# Patient Record
Sex: FEMALE | Race: WHITE
[De-identification: names, ages, dates, MRNs, and addresses within clinical notes are randomized per-mention and may not be internally consistent; named-entity substitution may affect disease eponyms.]

---

## 2022-12-05 ENCOUNTER — ASOB RESULT (OUTPATIENT)
Age: 32
End: 2022-12-05

## 2022-12-05 ENCOUNTER — TRANSCRIPTION ENCOUNTER (OUTPATIENT)
Age: 32
End: 2022-12-05

## 2022-12-05 ENCOUNTER — APPOINTMENT (OUTPATIENT)
Dept: ANTEPARTUM | Facility: CLINIC | Age: 32
End: 2022-12-05

## 2022-12-05 ENCOUNTER — INPATIENT (INPATIENT)
Facility: HOSPITAL | Age: 32
LOS: 0 days | Discharge: HOME | DRG: 833 | End: 2022-12-05
Attending: OBSTETRICS & GYNECOLOGY | Admitting: OBSTETRICS & GYNECOLOGY
Payer: COMMERCIAL

## 2022-12-05 ENCOUNTER — OUTPATIENT (OUTPATIENT)
Dept: INPATIENT UNIT | Facility: HOSPITAL | Age: 32
LOS: 1 days | Discharge: ANOTHER HOSPITAL | End: 2022-12-05
Payer: COMMERCIAL

## 2022-12-05 VITALS — HEART RATE: 84 BPM | OXYGEN SATURATION: 100 %

## 2022-12-05 VITALS — HEART RATE: 86 BPM | OXYGEN SATURATION: 98 %

## 2022-12-05 VITALS — HEIGHT: 62 IN | WEIGHT: 156.97 LBS

## 2022-12-05 DIAGNOSIS — O47.03 FALSE LABOR BEFORE 37 COMPLETED WEEKS OF GESTATION, THIRD TRIMESTER: ICD-10-CM

## 2022-12-05 DIAGNOSIS — O26.899 OTHER SPECIFIED PREGNANCY RELATED CONDITIONS, UNSPECIFIED TRIMESTER: ICD-10-CM

## 2022-12-05 PROBLEM — Z00.00 ENCOUNTER FOR PREVENTIVE HEALTH EXAMINATION: Status: ACTIVE | Noted: 2022-12-05

## 2022-12-05 LAB
ALBUMIN SERPL ELPH-MCNC: 2.9 G/DL — LOW (ref 3.3–5)
ALP SERPL-CCNC: 65 U/L — SIGNIFICANT CHANGE UP (ref 40–120)
ALT FLD-CCNC: 21 U/L — SIGNIFICANT CHANGE UP (ref 12–78)
AMNISURE ROM (RUPTURE OF MEMBRANES): NEGATIVE — SIGNIFICANT CHANGE UP
ANION GAP SERPL CALC-SCNC: 4 MMOL/L — LOW (ref 5–17)
APPEARANCE UR: CLEAR — SIGNIFICANT CHANGE UP
AST SERPL-CCNC: 14 U/L — LOW (ref 15–37)
BASOPHILS # BLD AUTO: 0.02 K/UL — SIGNIFICANT CHANGE UP (ref 0–0.2)
BASOPHILS NFR BLD AUTO: 0.2 % — SIGNIFICANT CHANGE UP (ref 0–2)
BILIRUB SERPL-MCNC: 0.2 MG/DL — SIGNIFICANT CHANGE UP (ref 0.2–1.2)
BILIRUB UR-MCNC: NEGATIVE — SIGNIFICANT CHANGE UP
BUN SERPL-MCNC: 6 MG/DL — LOW (ref 7–23)
CALCIUM SERPL-MCNC: 8.2 MG/DL — LOW (ref 8.5–10.1)
CHLORIDE SERPL-SCNC: 109 MMOL/L — HIGH (ref 96–108)
CO2 SERPL-SCNC: 24 MMOL/L — SIGNIFICANT CHANGE UP (ref 22–31)
COLOR SPEC: YELLOW — SIGNIFICANT CHANGE UP
COVID-19 SPIKE DOMAIN AB INTERP: POSITIVE
COVID-19 SPIKE DOMAIN ANTIBODY RESULT: >250 U/ML — HIGH
CREAT SERPL-MCNC: 0.44 MG/DL — LOW (ref 0.5–1.3)
DIFF PNL FLD: NEGATIVE — SIGNIFICANT CHANGE UP
EGFR: 133 ML/MIN/1.73M2 — SIGNIFICANT CHANGE UP
EOSINOPHIL # BLD AUTO: 0.04 K/UL — SIGNIFICANT CHANGE UP (ref 0–0.5)
EOSINOPHIL NFR BLD AUTO: 0.4 % — SIGNIFICANT CHANGE UP (ref 0–6)
FIBRONECTIN FETAL SPEC QL: POSITIVE
GLUCOSE SERPL-MCNC: 118 MG/DL — HIGH (ref 70–99)
GLUCOSE UR QL: NEGATIVE — SIGNIFICANT CHANGE UP
HBV SURFACE AB SER-ACNC: REACTIVE
HCT VFR BLD CALC: 33.3 % — LOW (ref 34.5–45)
HGB BLD-MCNC: 11.4 G/DL — LOW (ref 11.5–15.5)
IMM GRANULOCYTES NFR BLD AUTO: 1 % — HIGH (ref 0–0.9)
KETONES UR-MCNC: NEGATIVE — SIGNIFICANT CHANGE UP
LEUKOCYTE ESTERASE UR-ACNC: ABNORMAL
LYMPHOCYTES # BLD AUTO: 1.15 K/UL — SIGNIFICANT CHANGE UP (ref 1–3.3)
LYMPHOCYTES # BLD AUTO: 11.1 % — LOW (ref 13–44)
MCHC RBC-ENTMCNC: 31.8 PG — SIGNIFICANT CHANGE UP (ref 27–34)
MCHC RBC-ENTMCNC: 34.2 GM/DL — SIGNIFICANT CHANGE UP (ref 32–36)
MCV RBC AUTO: 92.8 FL — SIGNIFICANT CHANGE UP (ref 80–100)
MONOCYTES # BLD AUTO: 0.16 K/UL — SIGNIFICANT CHANGE UP (ref 0–0.9)
MONOCYTES NFR BLD AUTO: 1.5 % — LOW (ref 2–14)
NEUTROPHILS # BLD AUTO: 8.87 K/UL — HIGH (ref 1.8–7.4)
NEUTROPHILS NFR BLD AUTO: 85.8 % — HIGH (ref 43–77)
NITRITE UR-MCNC: NEGATIVE — SIGNIFICANT CHANGE UP
PH UR: 8 — SIGNIFICANT CHANGE UP (ref 5–8)
PLATELET # BLD AUTO: 275 K/UL — SIGNIFICANT CHANGE UP (ref 150–400)
POTASSIUM SERPL-MCNC: 3.9 MMOL/L — SIGNIFICANT CHANGE UP (ref 3.5–5.3)
POTASSIUM SERPL-SCNC: 3.9 MMOL/L — SIGNIFICANT CHANGE UP (ref 3.5–5.3)
PROT SERPL-MCNC: 6.6 GM/DL — SIGNIFICANT CHANGE UP (ref 6–8.3)
PROT UR-MCNC: NEGATIVE — SIGNIFICANT CHANGE UP
RBC # BLD: 3.59 M/UL — LOW (ref 3.8–5.2)
RBC # FLD: 12.7 % — SIGNIFICANT CHANGE UP (ref 10.3–14.5)
SARS-COV-2 IGG+IGM SERPL QL IA: >250 U/ML — HIGH
SARS-COV-2 IGG+IGM SERPL QL IA: POSITIVE
SARS-COV-2 RNA SPEC QL NAA+PROBE: SIGNIFICANT CHANGE UP
SODIUM SERPL-SCNC: 137 MMOL/L — SIGNIFICANT CHANGE UP (ref 135–145)
SP GR SPEC: 1.01 — SIGNIFICANT CHANGE UP (ref 1.01–1.02)
URATE SERPL-MCNC: 4.9 MG/DL — SIGNIFICANT CHANGE UP (ref 2.5–7)
UROBILINOGEN FLD QL: NEGATIVE — SIGNIFICANT CHANGE UP
WBC # BLD: 10.34 K/UL — SIGNIFICANT CHANGE UP (ref 3.8–10.5)
WBC # FLD AUTO: 10.34 K/UL — SIGNIFICANT CHANGE UP (ref 3.8–10.5)

## 2022-12-05 PROCEDURE — 86780 TREPONEMA PALLIDUM: CPT

## 2022-12-05 PROCEDURE — 36415 COLL VENOUS BLD VENIPUNCTURE: CPT

## 2022-12-05 PROCEDURE — 86900 BLOOD TYPING SEROLOGIC ABO: CPT

## 2022-12-05 PROCEDURE — 85025 COMPLETE CBC W/AUTO DIFF WBC: CPT

## 2022-12-05 PROCEDURE — 86901 BLOOD TYPING SEROLOGIC RH(D): CPT

## 2022-12-05 PROCEDURE — 86769 SARS-COV-2 COVID-19 ANTIBODY: CPT

## 2022-12-05 PROCEDURE — 76805 OB US >/= 14 WKS SNGL FETUS: CPT | Mod: 26

## 2022-12-05 PROCEDURE — 59025 FETAL NON-STRESS TEST: CPT

## 2022-12-05 PROCEDURE — 84112 EVAL AMNIOTIC FLUID PROTEIN: CPT

## 2022-12-05 PROCEDURE — 87591 N.GONORRHOEAE DNA AMP PROB: CPT

## 2022-12-05 PROCEDURE — 87653 STREP B DNA AMP PROBE: CPT

## 2022-12-05 PROCEDURE — 81001 URINALYSIS AUTO W/SCOPE: CPT

## 2022-12-05 PROCEDURE — 86762 RUBELLA ANTIBODY: CPT

## 2022-12-05 PROCEDURE — 86850 RBC ANTIBODY SCREEN: CPT

## 2022-12-05 PROCEDURE — 86706 HEP B SURFACE ANTIBODY: CPT

## 2022-12-05 PROCEDURE — G0378: CPT

## 2022-12-05 PROCEDURE — 82731 ASSAY OF FETAL FIBRONECTIN: CPT

## 2022-12-05 PROCEDURE — 87086 URINE CULTURE/COLONY COUNT: CPT

## 2022-12-05 PROCEDURE — U0003: CPT

## 2022-12-05 PROCEDURE — 76817 TRANSVAGINAL US OBSTETRIC: CPT | Mod: 26

## 2022-12-05 PROCEDURE — 99214 OFFICE O/P EST MOD 30 MIN: CPT

## 2022-12-05 PROCEDURE — 87491 CHLMYD TRACH DNA AMP PROBE: CPT

## 2022-12-05 PROCEDURE — 76819 FETAL BIOPHYS PROFIL W/O NST: CPT | Mod: 26

## 2022-12-05 PROCEDURE — 76817 TRANSVAGINAL US OBSTETRIC: CPT

## 2022-12-05 PROCEDURE — U0005: CPT

## 2022-12-05 PROCEDURE — 80053 COMPREHEN METABOLIC PANEL: CPT

## 2022-12-05 PROCEDURE — 84550 ASSAY OF BLOOD/URIC ACID: CPT

## 2022-12-05 RX ORDER — INDOMETHACIN 50 MG
25 CAPSULE ORAL EVERY 6 HOURS
Refills: 0 | Status: DISCONTINUED | OUTPATIENT
Start: 2022-12-05 | End: 2022-12-05

## 2022-12-05 RX ORDER — AMPICILLIN TRIHYDRATE 250 MG
1 CAPSULE ORAL EVERY 4 HOURS
Refills: 0 | Status: DISCONTINUED | OUTPATIENT
Start: 2022-12-05 | End: 2022-12-05

## 2022-12-05 RX ORDER — MAGNESIUM SULFATE 500 MG/ML
2 VIAL (ML) INJECTION
Qty: 40 | Refills: 0 | Status: DISCONTINUED | OUTPATIENT
Start: 2022-12-05 | End: 2022-12-05

## 2022-12-05 RX ORDER — INFLUENZA VIRUS VACCINE 15; 15; 15; 15 UG/.5ML; UG/.5ML; UG/.5ML; UG/.5ML
0.5 SUSPENSION INTRAMUSCULAR ONCE
Refills: 0 | Status: DISCONTINUED | OUTPATIENT
Start: 2022-12-05 | End: 2022-12-05

## 2022-12-05 RX ORDER — SODIUM CHLORIDE 9 MG/ML
1000 INJECTION, SOLUTION INTRAVENOUS
Refills: 0 | Status: DISCONTINUED | OUTPATIENT
Start: 2022-12-05 | End: 2022-12-05

## 2022-12-05 RX ORDER — ONDANSETRON 8 MG/1
4 TABLET, FILM COATED ORAL ONCE
Refills: 0 | Status: COMPLETED | OUTPATIENT
Start: 2022-12-05 | End: 2022-12-05

## 2022-12-05 RX ORDER — SODIUM CHLORIDE 9 MG/ML
1000 INJECTION, SOLUTION INTRAVENOUS ONCE
Refills: 0 | Status: COMPLETED | OUTPATIENT
Start: 2022-12-05 | End: 2022-12-05

## 2022-12-05 RX ORDER — INDOMETHACIN 50 MG
50 CAPSULE ORAL ONCE
Refills: 0 | Status: COMPLETED | OUTPATIENT
Start: 2022-12-05 | End: 2022-12-05

## 2022-12-05 RX ORDER — MAGNESIUM SULFATE 500 MG/ML
4 VIAL (ML) INJECTION ONCE
Refills: 0 | Status: DISCONTINUED | OUTPATIENT
Start: 2022-12-05 | End: 2022-12-05

## 2022-12-05 RX ORDER — SODIUM CHLORIDE 9 MG/ML
1000 INJECTION, SOLUTION INTRAVENOUS ONCE
Refills: 0 | Status: DISCONTINUED | OUTPATIENT
Start: 2022-12-05 | End: 2022-12-05

## 2022-12-05 RX ORDER — AMPICILLIN TRIHYDRATE 250 MG
2 CAPSULE ORAL ONCE
Refills: 0 | Status: COMPLETED | OUTPATIENT
Start: 2022-12-05 | End: 2022-12-05

## 2022-12-05 RX ORDER — INDOMETHACIN 50 MG
25 CAPSULE ORAL ONCE
Refills: 0 | Status: COMPLETED | OUTPATIENT
Start: 2022-12-05 | End: 2022-12-05

## 2022-12-05 RX ORDER — MAGNESIUM SULFATE 500 MG/ML
4 VIAL (ML) INJECTION ONCE
Refills: 0 | Status: COMPLETED | OUTPATIENT
Start: 2022-12-05 | End: 2022-12-05

## 2022-12-05 RX ADMIN — Medication 12 MILLIGRAM(S): at 07:15

## 2022-12-05 RX ADMIN — Medication 50 GM/HR: at 09:58

## 2022-12-05 RX ADMIN — Medication 216 GRAM(S): at 09:15

## 2022-12-05 RX ADMIN — SODIUM CHLORIDE 75 MILLILITER(S): 9 INJECTION, SOLUTION INTRAVENOUS at 09:34

## 2022-12-05 RX ADMIN — Medication 25 MILLIGRAM(S): at 13:53

## 2022-12-05 RX ADMIN — SODIUM CHLORIDE 1000 MILLILITER(S): 9 INJECTION, SOLUTION INTRAVENOUS at 05:16

## 2022-12-05 RX ADMIN — Medication 300 GRAM(S): at 09:15

## 2022-12-05 RX ADMIN — SODIUM CHLORIDE 125 MILLILITER(S): 9 INJECTION, SOLUTION INTRAVENOUS at 12:30

## 2022-12-05 RX ADMIN — Medication 50 MILLIGRAM(S): at 06:39

## 2022-12-05 RX ADMIN — SODIUM CHLORIDE 2000 MILLILITER(S): 9 INJECTION, SOLUTION INTRAVENOUS at 12:00

## 2022-12-05 RX ADMIN — Medication 25 MILLIGRAM(S): at 14:08

## 2022-12-05 RX ADMIN — ONDANSETRON 4 MILLIGRAM(S): 8 TABLET, FILM COATED ORAL at 10:00

## 2022-12-05 NOTE — CONSULT NOTE ADULT - ASSESSMENT
Assessment:     Recommendations:   -Observe on labor and delivery with serial cervical lengths  -Follow up GBS-sent from ELIZABETH Schneider Fellow

## 2022-12-05 NOTE — OB PROVIDER H&P - HISTORY OF PRESENT ILLNESS
31 yo  @ 28w 1d (MAGNUS:23) presenting as a transfer from [facility] for concern for  labor. Patient was awoken from sleep at 130am with contraction like pain. She also noticed at that time, increased discharge. Octavia called her doctor and was told to go to the hospital for evaluation. On her way to the hospital patient started to time the contractions and they were every 2-4 minutes with a pain of 6/10. Patient also had recent unprotected intercourse last nig. At [facility] patient had a complete work up, which results of FFN being positive. (Please see below for further examination details). Patient was given Indocin, Bmz, Amp, Mag and decision was to transfer patient to higher level of care. –VB, -LOF, +FM. denies fever, chills, nausea, vomiting, diarrhea, headache, constipation, dizziness, syncope, chest pain, palpitations, shortness of breath, dysuria, urgency, frequency.  PNC: [facility] - [place]  GBS pending   EFW: 1221g  ObHx: denies  GynHx: denies fibroids, cysts, STD, STI, abn pap  MedHx: denies   SrgHx: denies   PsychHx: denies anxiety and depression  SocialHx: denies smoking, drinking and drugs   AllergyHx: denies  RxHx: PNV

## 2022-12-05 NOTE — OB PROVIDER TRIAGE NOTE - HISTORY OF PRESENT ILLNESS
33 yo  @ 28w 1d (MAGNUS:23) presenting as a transfer from [facility] for concern for  labor. Patient was awoken from sleep at 130am with contraction like pain. She also noticed at that time, increased discharge. Octavia called her doctor and was told to go to the hospital for evaluation. On her way to the hospital patient started to time the contractions and they were every 2-4 minutes with a pain of 6/10. Patient also had recent unprotected intercourse last nig. At [facility] patient had a complete work up, which results of FFN being positive. (Please see below for further examination details). Patient was given Indocin, Bmz, Amp, Mag and decision was to transfer patient to higher level of care. –VB, -LOF, +FM. denies fever, chills, nausea, vomiting, diarrhea, headache, constipation, dizziness, syncope, chest pain, palpitations, shortness of breath, dysuria, urgency, frequency.  PNC: [facility] - [place]  GBS pending   EFW: 1221g  ObHx: denies  GynHx: denies fibroids, cysts, STD, STI, abn pap  MedHx: denies   SrgHx: denies   PsychHx: denies anxiety and depression  SocialHx: denies smoking, drinking and drugs   AllergyHx: denies  RxHx: PNV

## 2022-12-05 NOTE — OB PROVIDER H&P - NSHPPHYSICALEXAM_GEN_ALL_CORE
NAD  CV:RRR  Resp:CTA b/l   Abd: Nontender Gravid   sve:0/0/-3   TVUS: 12/5: FH:151, posterior placenta, CL:3.3 BPP:8/8   FHT: Baseline 150, moderate variability, +accesl, -decels  [place]: q 2-3 minutes at [place]   Sono: Vtx, laura:21.8 8/8, 1221g(55%), CL:3.8

## 2022-12-05 NOTE — OB PROVIDER TRIAGE NOTE - NSOBPROVIDERNOTE_OBGYN_ALL_OB_FT
32 year old  @ 28 weeks 1 day presenting as a transfer from [place]     -Indocin 50mg STAT followed by Indocin 25mg   -Bmz#1 @ 715 am on    -Mag started at 915am  []GBS pending   -GC/CT pending   -Vaginitis panel     Addendum     -NST to be performed with [facility]   -Low concern for PTL at this time, patient CL is long, contractions are spacing , patient more comfortable   -VE:0/0/-3   -Discontinue Magnesium, Ampicillin at this time     Addendum   - Patient to be discharged on Indocin  -Return tomorrow for BMZ#2  -GBS pending    -Recommend BPP / RASHEEDA check in 2-3 days as Indocin can cause oligohydramnios      d/w Dr. Jose G Norwood, PGY3 32 year old  @ 28 weeks 1 day presenting as a transfer from [place]     -Indocin 50mg STAT followed by Indocin 25mg   -Bmz#1 @ 715 am on    -Mag started at 915am  []GBS pending   -GC/CT pending   -Vaginitis panel     Addendum     -NST to be performed with [place]   -Low concern for PTL at this time, patient CL is long, contractions are spacing , patient more comfortable   -VE:0/0/-3   -Discontinue Magnesium, Ampicillin at this time     Addendum   - Patient to be discharged  to follow up with [place] for BMZ#2  -GBS pending     -Return precuations given       d/w Dr. Jose G Norwood, PGY3

## 2022-12-05 NOTE — DISCHARGE NOTE ANTEPARTUM - CARE PLAN
Principal Discharge DX:	 uterine contractions, antepartum, third trimester  Assessment and plan of treatment:	Patient was evaluated for  CTX @ 28w1d  1) Membranes intact: amnisure negative, FFP positive, CL 3.3cm, SVE 0.5/50/-3  2) s/p betamethasone @ 0715 for fetal lung maturity  3) 50gm indocin given at 0649 for tocolysis  4) Mag started at 0920 for fetal neuroprotection  5) Ampicillin started for GBS ppx. Cultures obtained.   1

## 2022-12-05 NOTE — DISCHARGE NOTE ANTEPARTUM - PROVIDER TOKENS
FREE:[LAST:[[facility]],PHONE:[(   )    -],FAX:[(   )    -],ADDRESS:[[facility]  [place]]]

## 2022-12-05 NOTE — DISCHARGE NOTE ANTEPARTUM - MEDICATION SUMMARY - MEDICATIONS TO TAKE
I will START or STAY ON the medications listed below when I get home from the hospital:    Prenatal Multivitamins with Folic Acid 1 mg oral tablet  -- 1 tab(s) by mouth once a day  -- Indication: For home med

## 2022-12-05 NOTE — CONSULT NOTE ADULT - PROBLEM SELECTOR RECOMMENDATION 9
- maternal VSS, afebrile  - ctx q2m on toco s/p LR bolus, FFN pos, CL 3.3cm, cervix appeared closed on spec  - no gross pooling on spec, amnisure neg, bedside RASHEEDA 15  - vaginitis/GCCT pending  - UA only w trace leuk esterase, neg nitrites, UCx pending  - administer indocin 50mg stat followed by 25mg q6h x8 doses  - administer bmz q24h x2 doses  - repeat vaginal exam if pain/pressure increases and/or ctx do not subside 2h after indocin administration  - administer amp, Mg if progresses into PTL

## 2022-12-05 NOTE — DISCHARGE NOTE ANTEPARTUM - CARE PROVIDER_API CALL
Norma Cuevas)  Obstetrics and Gynecology  [address] [place] [address]  Phone: [phone]  Fax: [phone]  Follow Up Time:

## 2022-12-05 NOTE — OB PROVIDER H&P - ASSESSMENT
32 year old  @ 28 weeks 1 day presenting as a transfer from [place]     -Indocin 50mg STAT followed by Indocin 25mg   -Bmz#1 @ 715 am on    -Mag started at 915am  []GBS pending   -GC/CT pending   -Vaginitis panel     Addendum     -NST to be performed with [place]   -Low concern for PTL at this time, patient CL is long, contractions are spacing , patient more comfortable   -VE:0/0/-3   -Discontinue Magnesium, Ampicillin at this time     Addendum   - Patient to be discharged  to follow up with [place] for BMZ#2  -GBS pending     -Return precautions given       d/w Dr. Jose G Norwood, PGY3

## 2022-12-05 NOTE — DISCHARGE NOTE ANTEPARTUM - CARE PLAN
Principal Discharge DX:	 contractions  Assessment and plan of treatment:	phyllis presented with  contractions that resolved with Indocin. S/p BMZ # 1, needs to get BMZ#2 tomorrow   1

## 2022-12-05 NOTE — CONSULT NOTE ADULT - SUBJECTIVE AND OBJECTIVE BOX
Maternal Fetal Medicine Consult Note    HPI: The patient is a 31 yo  at 28w1d based on reported MAGNUS 23       (MAGNUS 23) presenting as a transfer from [facility] for concern for  labor. Patient was awoken from sleep at 130am with contraction like pain. She also noticed at that time, increased discharge. Octavia called her doctor and was told to go to the hospital for evaluation. On her way to the hospital patient started to time the contractions and they were every 2-4 minutes with a pain of 6/10. Patient also had recent unprotected intercourse last nig. At [facility] patient had a complete work up, which results of FFN being positive. (Please see below for further examination details). Patient was given Indocin, Bmz, Amp, Mag and decision was to transfer patient to higher level of care. –VB, -LOF, +FM. denies fever, chills, nausea, vomiting, diarrhea, headache, constipation, dizziness, syncope, chest pain, palpitations, shortness of breath, dysuria, urgency, frequency.    PNC: [facility]  [place]  GBS pending   EFW: 1221g    History:   OB: G1  Gyn: denies fibroids, cysts, STD, STI, abn pap  PMH: denies   PSH: denies   Meds: PNV   Alls: NKDA  SH: denies smoking, drinking and drugs; denies anxiety and depression    Objective:     T(C): 37.0 (22 @ 17:56), Max: 37.0 (22 @ 17:56)  HR: 86 (22 @ 18:04) (71 - 102)  BP: 109/62 (22 @ 17:56) (106/62 - 123/81)  RR: 18 (22 @ 17:56) (18 - 18)  SpO2: 98% (22 @ 18:04) (96% - 100%)  Exam:         Assessment:     Plan:     Lucero Webber MD   [facility] Fellow   Maternal Fetal Medicine Consult Note    HPI: The patient is a 33 yo  at 28w1d based on reported MAGNUS 23 consistent with bedside biometry transferred from [facility] for  labor after reporting painful contractions overnight, accompanied by increased vaginal discharge. Preceding contraction onset patient had vaginal intercourse with partner ejaculation. At [place] she was found to be shelli every 2 minutes with some cervical effacement noted (FT/L/H --> FT/50/-3) and was given a dose of betamethasone for fetal lung maturity (7:15am) as well as ampicillin, indomethacin for tocolysis, and magnesium for fetal neuroprotection. Cervical length performed at [place] was 3.3cm, FFN positive. GBS collected and pending.     On arrival to [place], patient reports improvement in contraction-related pain. She now has mild pelvic cramping. She denies vaginal bleeding, leakage of fluid, fevers, chills, recent illness, urinary complaints. Prenatal care w/ Dr. Anguiano at [facility]; patient reports prenatal care uncomplicated.     History:   OB: G1  Gyn: denies fibroids, cysts, STD, STI, abn pap  PMH: denies   PSH: denies   Meds: PNV   Alls: NKDA  SH: denies smoking, drinking and drugs; denies anxiety and depression    Objective:     T(C): 37.0 (22 @ 17:56), Max: 37.0 (22 @ 17:56)  HR: 86 (22 @ 18:04) (71 - 102)  BP: 109/62 (22 @ 17:56) (106/62 - 123/81)  RR: 18 (22 @ 17:56) (18 - 18)  SpO2: 98% (22 @ 18:04) (96% - 100%)    Exam:   Gen: NAD, appears comfortable   Abd: Soft, gravid no fundal tenderness   Cervical exam (per resident): closed/long/posterior     NST ***                Maternal Fetal Medicine Consult Note    HPI: The patient is a 33 yo  at 28w1d based on reported MAGNUS 23 consistent with bedside biometry transferred from [facility] for  labor after reporting painful contractions overnight, accompanied by increased vaginal discharge. Preceding contraction onset patient had vaginal intercourse with partner ejaculation. At [place] she was found to be shelli every 2 minutes with some cervical effacement noted (FT/L/H --> FT/50/-3) and was given a dose of betamethasone for fetal lung maturity (7:15am) as well as ampicillin, indomethacin for tocolysis, and magnesium for fetal neuroprotection. Cervical length performed at [place] was 3.3cm, FFN positive. GBS collected and pending.     On arrival to [place], patient reports improvement in contraction-related pain. She now has mild pelvic cramping. She denies vaginal bleeding, leakage of fluid, fevers, chills, recent illness, urinary complaints. Prenatal care w/ Dr. Anguiano at [facility]; patient reports prenatal care uncomplicated.     History:   OB: G1  Gyn: denies fibroids, cysts, STD, STI, abn pap  PMH: denies   PSH: denies   Meds: PNV   Alls: NKDA  SH: denies smoking, drinking and drugs; denies anxiety and depression    Objective:     T(C): 37.0 (22 @ 17:56), Max: 37.0 (22 @ 17:56)  HR: 86 (22 @ 18:04) (71 - 102)  BP: 109/62 (22 @ 17:56) (106/62 - 123/81)  RR: 18 (22 @ 17:56) (18 - 18)  SpO2: 98% (22 @ 18:04) (96% - 100%)    Exam:   Gen: NAD, appears comfortable   Abd: Soft, gravid no fundal tenderness   Cervical exam (per resident): closed/long/posterior      moderate variability, +acceleration (10x10), -decelerations, loss of contact   Contractions q2-6 mins --> irritability on toco  Reactive NST     Sono: Cephalic, placenta posterior, RASHEEDA 22cm, EFW 1221g

## 2022-12-05 NOTE — DISCHARGE NOTE ANTEPARTUM - PLAN OF CARE
Patient was evaluated for  CTX @ 28w1d  1) Membranes intact: amnisure negative, FFP positive, CL 3.3cm, SVE 0.5/50/-3  2) s/p betamethasone @ 0715 for fetal lung maturity  3) 50gm indocin given at 0649 for tocolysis  4) Mag started at 0920 for fetal neuroprotection  5) Ampicillin started for GBS ppx. Cultures obtained.

## 2022-12-05 NOTE — DISCHARGE NOTE ANTEPARTUM - PATIENT PORTAL LINK FT
You can access the FollowMyHealth Patient Portal offered by [facility] by registering at the following website: [email or web address]. By joining [employer]’s FollowMyHealth portal, you will also be able to view your health information using other applications (apps) compatible with our system.

## 2022-12-05 NOTE — DISCHARGE NOTE ANTEPARTUM - HOSPITAL COURSE
33 yo  @ 28w 1d (MAGNUS:23) presenting as a transfer from [facility] for concern for  labor. Patient was awoken from sleep at 130am with contraction like pain. She also noticed at that time, increased discharge. Octavia called her doctor and was told to go to the hospital for evaluation. On her way to the hospital patient started to time the contractions and they were every 2-4 minutes with a pain of 6/10. Patient also had recent unprotected intercourse last nig. At [facility] patient had a complete work up, which results of FFN being positive. (Please see below for further examination details). Patient was given Indocin, Bmz, Amp, Mag and decision was to transfer patient to higher level of care. –VB, -LOF, +FM. denies fever, chills, nausea, vomiting, diarrhea, headache, constipation, dizziness, syncope, chest pain, palpitations, shortness of breath, dysuria, urgency, frequency.

## 2022-12-05 NOTE — DISCHARGE NOTE ANTEPARTUM - PATIENT PORTAL LINK FT
You can access the FollowMyHealth Patient Portal offered by [facility] by registering at the following website: [email or web address]. By joining [employer]’s FollowMyHealth portal, you will also be able to view your health information using other applications (apps) compatible with our system.

## 2022-12-05 NOTE — CONSULT NOTE ADULT - PROBLEM SELECTOR RECOMMENDATION 2
- NST reactive for GA, continuous monitoring  - BPP 8/8, RASHEEDA 15  - c/w daily pnv  - GBS pending, administer amp if progresses into PTL

## 2022-12-05 NOTE — CONSULT NOTE ADULT - ASSESSMENT
Assessment:     Plan:   -Spoke to OB resident Cheyenne at [facility] regarding patient presentation for second dose betamethasone on 12/6     ELIZABETH Rooney Fellow Assessment: 31 yo  at 28w1d presenting with  contractions found to have made cervical effacement, with a cervical length of 3.3cm and found to have closed internal os on repeat exam. Received betamethasone x1 prior to transfer. Following initiation of indomethacin and IV hydration patient's contractions spaced. After hours of observation patient remained comfortable.     Plan:   -Patient not in  labor found to be closed on repeat exam following transfer; in setting of cervical length >3cm, predicted likelihood of delivery within the next week is low (even despite positive FFN).   -Patient offered admission for observation versus discharge with outpatient second dose of betamethasone; after a few hours of feeling comfortable patient opted for discharge to home.  labor precautions discussed extensively.   -Would not necessarily recommend indomethacin if patient not in true  labor as purpose is to provide time to second betamethasone injection (which we anticipate she will make it to regardless). This plan was reviewed with the patient.   -Follow up GBS result   -Discussed with patient pelvic rest over next few days; otherwise no necessary activity limitations   -Patient has follow up sono at [facility] next week; encouraged to attend   -Spoke to OB resident Cheyenne at [facility] regarding patient presentation for second dose betamethasone on      ELIZABETH Rooney Fellow  Seen and d/w Dr. Benson

## 2022-12-05 NOTE — DISCHARGE NOTE ANTEPARTUM - ADDITIONAL INSTRUCTIONS
patient presented with  contractions that resolved with Indocin. S/p BMZ # 1, needs to get BMZ#2 tomorrow

## 2022-12-05 NOTE — OB PROVIDER TRIAGE NOTE - NSHPPHYSICALEXAM_GEN_ALL_CORE
NAD  CV:RRR  Resp:CTA b/l   Abd: Nontender Gravid   sve:0/0/-3   TVUS: 12/5: FH:151, posterior placenta, CL:3.3 BPP:8/8   FHT: Baseline 150, moderate variability, +accesl, -decels  [place]: q 2-3 minutes at [place]   Sono: Vtx, laura:21.8 8/8, 1221g(55%), CL:3.8

## 2022-12-05 NOTE — CONSULT NOTE ADULT - NSCONSULTADDITIONALINFOA_GEN_ALL_CORE
MFM - IUP at 28 weeks admitted with  contractions and threatened  labor.  FFN positive with regular contractions on tocometer.  FHRT Category I.  Cultures pending.  Recommend betamethasone and tocolysis with indocin.  Proceed with ampicillin, MGSO4 for fetal neuroprotection and consider transfer to higher level of NICU care if continued contractions, cervical change, or any change in maternal and/or fetal status.   Cleo Mann MD  Maternal Fetal Medicine

## 2022-12-05 NOTE — DISCHARGE NOTE ANTEPARTUM - NS MD DC FALL RISK RISK
For information on Fall & Injury Prevention, visit: [email or web address] OR  [email or web address] OR  https://www.cdc.gov/steadi/patient.html

## 2022-12-05 NOTE — PATIENT PROFILE OB - PURPOSEFUL PROACTIVE ROUNDING
thin frail, a x o x 3 speaking full sentences thin frail, speaking full sentences 2 L NC no use of accessory neck muscles thin frail, wearing blue knitted cap a x o x 3 on 4 L NC as above Patient

## 2022-12-05 NOTE — OB RN PATIENT PROFILE - FALL HARM RISK - UNIVERSAL INTERVENTIONS
Bed in lowest position, wheels locked, appropriate side rails in place/Call bell, personal items and telephone in reach/Instruct patient to call for assistance before getting out of bed or chair/Non-slip footwear when patient is out of bed/[place] to call system/Physically safe environment - no spills, clutter or unnecessary equipment/Purposeful Proactive Rounding/Room/bathroom lighting operational, light cord in reach

## 2022-12-05 NOTE — OB RN PATIENT PROFILE - FUNCTIONAL ASSESSMENT - BASIC MOBILITY SCORE HIDDEN
Quality 431: Preventive Care And Screening: Unhealthy Alcohol Use - Screening: Patient screened for unhealthy alcohol use using a single question and scores less than 2 times per year
Quality 130: Documentation Of Current Medications In The Medical Record: Current Medications Documented
Quality 226: Preventive Care And Screening: Tobacco Use: Screening And Cessation Intervention: Patient screened for tobacco use and is an ex/non-smoker
Detail Level: Detailed
24

## 2022-12-05 NOTE — DISCHARGE NOTE ANTEPARTUM - NS MD DC FALL RISK RISK
For information on Fall & Injury Prevention, visit: [email or web address] OR  [email or web address] OR  https://www.cdc.gov/steadi/patient.html

## 2022-12-05 NOTE — CONSULT NOTE ADULT - SUBJECTIVE AND OBJECTIVE BOX
Maternal Fetal Medicine Consult Note    HPI: 33 yo  at *** dated by *** transferred from [facility] for contractions beginning overnight.   PNC at [facility].     OB: G1  Gyn: ***  PMH: Denies  PSH: Denies  Meds: ***  Alls:   SH:     Objective:     T(C): 36.7 (22 @ 11:37), Max: 36.7 (22 @ 11:25)  HR: 79 (22 @ 12:03) (71 - 84)  BP: 121/74 (22 @ 11:56) (117/71 - 121/74)  RR: 18 (22 @ 11:37) (18 - 18)  SpO2: 99% (22 @ 12:03) (99% - 100%)    Exam:   Gen: NAD   Abd: soft, gravid with palpable contractions, nontender     Labs:   FFN positive ([place])   Amnisure negative     Sono: ***     NST: 145 bpm, moderate variability, -accelerations, -decelerations   Contractions q2-7 minutes   Cat I tracing

## 2022-12-05 NOTE — CONSULT NOTE ADULT - SUBJECTIVE AND OBJECTIVE BOX
32y  @28w1d GA per pt presents c/o intermittent abdominal pressure q2m for the past few hours with associated rectal pressure. Pt states she woke up from feeling intermittent midline upper abdominal pressure that radiated to the sides at times. She denies any associated pain. She notes that she noticed her underwear was damp when she woke up, but she hasn't had continuous leakage. She endorses normal FM, denies vaginal bleeding. Denies vaginal irritation/burning. Denies dysuria, hematuria. States her pregnancy has been uncomplicated so far. She follows w [facility].    PObGyn: denies h/o prior pregnancy, abnl pap, STDs, cysts/fibroids  PMH/PSH: denies  Meds: pnv  All: nkda  SH: never smoker, denies alcohol/drug use in pregnancy    VS as per flowsheet, afebrile, normotensive  Gen: well-appearing, NAD  Neuro: A&Ox3  Resp: breathing comfortably on RA  Abd: nontender, gravid  SSE: cervix appeared .5cm dilated, normal vaginal discharge, no gross pooling, amnisure neg, GCCT/vaginitis swabs pending, FFN pos  FHT: baseline 150, mod variability, +accels, -decels  [place]: ctx q2-3m    Bedside US: BPP 8/8, RASHEEDA 15, posterior placenta    AmniSure ROM (Rupture of Membranes): Negative: informed LISA Salazar  2022 04:46:42 EST (22 @ 04:22)    Fibronectin, Fetal - Result: Positive (22 @ 04:22)    CBC, CMP pending    < from: US Transvaginal, OB (22 @ 04:57) >  FINDINGS:    A single, viable intrauterine gestation is identified in   cephalicpresentation. The fetal heart rate measures 151beats per minute.    The placenta is posterior without previa. Cervix is long and closed   measuring 3.3 cm on transvaginal study. Engorgement of vessels identified   within the posterior aspect of the cervix. Amniotic fluid appears   subjectively within normal limits.    IMPRESSION:    Single live intrauterine gestation in cephalic presentation.  Cervix is long and closed measuring 3.3 cm on transvaginal study.    < end of copied text >

## 2022-12-05 NOTE — DISCHARGE NOTE ANTEPARTUM - HOSPITAL COURSE
Patient was evaluated for  CTX @ 28w1d. Membranes intact: amnisure negative, FFP positive, CL 3.3cm, SVE 0.5/50/-3. Betamethasone @ 0715 for fetal lung maturity, 50gm indocin given at 0649 for tocolysis. Mag started at 0920 for fetal neuroprotection. Ampicillin started for GBS ppx. Cultures obtained. COVID swab pending. Transferred to [facility] for antepartum management.

## 2022-12-06 ENCOUNTER — OUTPATIENT (OUTPATIENT)
Dept: INPATIENT UNIT | Facility: HOSPITAL | Age: 32
LOS: 1 days | Discharge: HOME | End: 2022-12-06
Payer: COMMERCIAL

## 2022-12-06 DIAGNOSIS — O26.899 OTHER SPECIFIED PREGNANCY RELATED CONDITIONS, UNSPECIFIED TRIMESTER: ICD-10-CM

## 2022-12-06 PROBLEM — Z78.9 OTHER SPECIFIED HEALTH STATUS: Chronic | Status: ACTIVE | Noted: 2022-12-05

## 2022-12-06 LAB
CULTURE RESULTS: SIGNIFICANT CHANGE UP
GROUP B BETA STREP DNA (PCR): SIGNIFICANT CHANGE UP
RUBV IGG SER-ACNC: 8.3 INDEX — SIGNIFICANT CHANGE UP
RUBV IGG SER-IMP: POSITIVE — SIGNIFICANT CHANGE UP
SOURCE GROUP B STREP: SIGNIFICANT CHANGE UP
SPECIMEN SOURCE: SIGNIFICANT CHANGE UP
T PALLIDUM AB TITR SER: NEGATIVE — SIGNIFICANT CHANGE UP

## 2022-12-06 PROCEDURE — 59025 FETAL NON-STRESS TEST: CPT

## 2022-12-06 PROCEDURE — 96372 THER/PROPH/DIAG INJ SC/IM: CPT

## 2022-12-06 PROCEDURE — 99214 OFFICE O/P EST MOD 30 MIN: CPT

## 2022-12-06 RX ADMIN — Medication 12 MILLIGRAM(S): at 08:03

## 2022-12-07 DIAGNOSIS — Z3A.00 WEEKS OF GESTATION OF PREGNANCY NOT SPECIFIED: ICD-10-CM

## 2022-12-07 DIAGNOSIS — O26.899 OTHER SPECIFIED PREGNANCY RELATED CONDITIONS, UNSPECIFIED TRIMESTER: ICD-10-CM

## 2022-12-08 DIAGNOSIS — O26.899 OTHER SPECIFIED PREGNANCY RELATED CONDITIONS, UNSPECIFIED TRIMESTER: ICD-10-CM

## 2022-12-08 DIAGNOSIS — Z3A.00 WEEKS OF GESTATION OF PREGNANCY NOT SPECIFIED: ICD-10-CM

## 2023-02-06 ENCOUNTER — ASOB RESULT (OUTPATIENT)
Age: 33
End: 2023-02-06

## 2023-02-06 ENCOUNTER — APPOINTMENT (OUTPATIENT)
Dept: ANTEPARTUM | Facility: CLINIC | Age: 33
End: 2023-02-06
Payer: COMMERCIAL

## 2023-02-06 PROCEDURE — 76816 OB US FOLLOW-UP PER FETUS: CPT

## 2023-02-27 ENCOUNTER — TRANSCRIPTION ENCOUNTER (OUTPATIENT)
Age: 33
End: 2023-02-27

## 2023-02-27 ENCOUNTER — INPATIENT (INPATIENT)
Facility: HOSPITAL | Age: 33
LOS: 1 days | Discharge: HOME | End: 2023-03-01
Attending: OBSTETRICS & GYNECOLOGY | Admitting: OBSTETRICS & GYNECOLOGY
Payer: COMMERCIAL

## 2023-02-27 VITALS — WEIGHT: 169.98 LBS | HEIGHT: 62 IN

## 2023-02-27 DIAGNOSIS — O26.899 OTHER SPECIFIED PREGNANCY RELATED CONDITIONS, UNSPECIFIED TRIMESTER: ICD-10-CM

## 2023-02-27 LAB
BASOPHILS # BLD AUTO: 0.04 K/UL — SIGNIFICANT CHANGE UP (ref 0–0.2)
BASOPHILS NFR BLD AUTO: 0.4 % — SIGNIFICANT CHANGE UP (ref 0–2)
COVID-19 SPIKE DOMAIN AB INTERP: POSITIVE
COVID-19 SPIKE DOMAIN ANTIBODY RESULT: >250 U/ML — HIGH
EOSINOPHIL # BLD AUTO: 0.15 K/UL — SIGNIFICANT CHANGE UP (ref 0–0.5)
EOSINOPHIL NFR BLD AUTO: 1.5 % — SIGNIFICANT CHANGE UP (ref 0–6)
HBV SURFACE AG SERPL QL IA: SIGNIFICANT CHANGE UP
HCT VFR BLD CALC: 35.3 % — SIGNIFICANT CHANGE UP (ref 34.5–45)
HGB BLD-MCNC: 11.9 G/DL — SIGNIFICANT CHANGE UP (ref 11.5–15.5)
IMM GRANULOCYTES NFR BLD AUTO: 0.9 % — SIGNIFICANT CHANGE UP (ref 0–0.9)
LYMPHOCYTES # BLD AUTO: 1.92 K/UL — SIGNIFICANT CHANGE UP (ref 1–3.3)
LYMPHOCYTES # BLD AUTO: 19.5 % — SIGNIFICANT CHANGE UP (ref 13–44)
MCHC RBC-ENTMCNC: 30.6 PG — SIGNIFICANT CHANGE UP (ref 27–34)
MCHC RBC-ENTMCNC: 33.7 GM/DL — SIGNIFICANT CHANGE UP (ref 32–36)
MCV RBC AUTO: 90.7 FL — SIGNIFICANT CHANGE UP (ref 80–100)
MONOCYTES # BLD AUTO: 0.84 K/UL — SIGNIFICANT CHANGE UP (ref 0–0.9)
MONOCYTES NFR BLD AUTO: 8.5 % — SIGNIFICANT CHANGE UP (ref 2–14)
NEUTROPHILS # BLD AUTO: 6.8 K/UL — SIGNIFICANT CHANGE UP (ref 1.8–7.4)
NEUTROPHILS NFR BLD AUTO: 69.2 % — SIGNIFICANT CHANGE UP (ref 43–77)
PLATELET # BLD AUTO: 239 K/UL — SIGNIFICANT CHANGE UP (ref 150–400)
RBC # BLD: 3.89 M/UL — SIGNIFICANT CHANGE UP (ref 3.8–5.2)
RBC # FLD: 13.3 % — SIGNIFICANT CHANGE UP (ref 10.3–14.5)
SARS-COV-2 IGG+IGM SERPL QL IA: >250 U/ML — HIGH
SARS-COV-2 IGG+IGM SERPL QL IA: POSITIVE
SARS-COV-2 RNA SPEC QL NAA+PROBE: SIGNIFICANT CHANGE UP
T PALLIDUM AB TITR SER: NEGATIVE — SIGNIFICANT CHANGE UP
WBC # BLD: 9.84 K/UL — SIGNIFICANT CHANGE UP (ref 3.8–10.5)
WBC # FLD AUTO: 9.84 K/UL — SIGNIFICANT CHANGE UP (ref 3.8–10.5)

## 2023-02-27 PROCEDURE — 86769 SARS-COV-2 COVID-19 ANTIBODY: CPT

## 2023-02-27 PROCEDURE — 85018 HEMOGLOBIN: CPT

## 2023-02-27 PROCEDURE — 86901 BLOOD TYPING SEROLOGIC RH(D): CPT

## 2023-02-27 PROCEDURE — 99214 OFFICE O/P EST MOD 30 MIN: CPT

## 2023-02-27 PROCEDURE — 85014 HEMATOCRIT: CPT

## 2023-02-27 PROCEDURE — C1889: CPT

## 2023-02-27 PROCEDURE — 94760 N-INVAS EAR/PLS OXIMETRY 1: CPT

## 2023-02-27 PROCEDURE — 36415 COLL VENOUS BLD VENIPUNCTURE: CPT

## 2023-02-27 PROCEDURE — 86780 TREPONEMA PALLIDUM: CPT

## 2023-02-27 PROCEDURE — 85025 COMPLETE CBC W/AUTO DIFF WBC: CPT

## 2023-02-27 PROCEDURE — 59050 FETAL MONITOR W/REPORT: CPT

## 2023-02-27 PROCEDURE — 87340 HEPATITIS B SURFACE AG IA: CPT

## 2023-02-27 PROCEDURE — 86850 RBC ANTIBODY SCREEN: CPT

## 2023-02-27 PROCEDURE — 87635 SARS-COV-2 COVID-19 AMP PRB: CPT

## 2023-02-27 PROCEDURE — 86900 BLOOD TYPING SEROLOGIC ABO: CPT

## 2023-02-27 RX ORDER — SODIUM CHLORIDE 9 MG/ML
3 INJECTION INTRAMUSCULAR; INTRAVENOUS; SUBCUTANEOUS EVERY 8 HOURS
Refills: 0 | Status: DISCONTINUED | OUTPATIENT
Start: 2023-02-27 | End: 2023-03-01

## 2023-02-27 RX ORDER — MAGNESIUM HYDROXIDE 400 MG/1
30 TABLET, CHEWABLE ORAL
Refills: 0 | Status: DISCONTINUED | OUTPATIENT
Start: 2023-02-27 | End: 2023-03-01

## 2023-02-27 RX ORDER — IBUPROFEN 200 MG
600 TABLET ORAL EVERY 6 HOURS
Refills: 0 | Status: COMPLETED | OUTPATIENT
Start: 2023-02-27 | End: 2024-01-26

## 2023-02-27 RX ORDER — HYDROCORTISONE 1 %
1 OINTMENT (GRAM) TOPICAL EVERY 6 HOURS
Refills: 0 | Status: DISCONTINUED | OUTPATIENT
Start: 2023-02-27 | End: 2023-03-01

## 2023-02-27 RX ORDER — ACETAMINOPHEN 500 MG
975 TABLET ORAL
Refills: 0 | Status: DISCONTINUED | OUTPATIENT
Start: 2023-02-27 | End: 2023-03-01

## 2023-02-27 RX ORDER — CHLORHEXIDINE GLUCONATE 213 G/1000ML
1 SOLUTION TOPICAL ONCE
Refills: 0 | Status: DISCONTINUED | OUTPATIENT
Start: 2023-02-27 | End: 2023-02-27

## 2023-02-27 RX ORDER — ONDANSETRON 8 MG/1
4 TABLET, FILM COATED ORAL ONCE
Refills: 0 | Status: COMPLETED | OUTPATIENT
Start: 2023-02-27 | End: 2023-02-27

## 2023-02-27 RX ORDER — AER TRAVELER 0.5 G/1
1 SOLUTION RECTAL; TOPICAL EVERY 4 HOURS
Refills: 0 | Status: DISCONTINUED | OUTPATIENT
Start: 2023-02-27 | End: 2023-03-01

## 2023-02-27 RX ORDER — FAMOTIDINE 10 MG/ML
20 INJECTION INTRAVENOUS
Refills: 0 | Status: DISCONTINUED | OUTPATIENT
Start: 2023-02-27 | End: 2023-02-27

## 2023-02-27 RX ORDER — OXYCODONE HYDROCHLORIDE 5 MG/1
5 TABLET ORAL ONCE
Refills: 0 | Status: DISCONTINUED | OUTPATIENT
Start: 2023-02-27 | End: 2023-03-01

## 2023-02-27 RX ORDER — CITRIC ACID/SODIUM CITRATE 300-500 MG
30 SOLUTION, ORAL ORAL ONCE
Refills: 0 | Status: DISCONTINUED | OUTPATIENT
Start: 2023-02-27 | End: 2023-02-27

## 2023-02-27 RX ORDER — KETOROLAC TROMETHAMINE 30 MG/ML
30 SYRINGE (ML) INJECTION ONCE
Refills: 0 | Status: DISCONTINUED | OUTPATIENT
Start: 2023-02-27 | End: 2023-03-01

## 2023-02-27 RX ORDER — BENZOCAINE 10 %
1 GEL (GRAM) MUCOUS MEMBRANE EVERY 6 HOURS
Refills: 0 | Status: DISCONTINUED | OUTPATIENT
Start: 2023-02-27 | End: 2023-03-01

## 2023-02-27 RX ORDER — FAMOTIDINE 10 MG/ML
20 INJECTION INTRAVENOUS
Refills: 0 | Status: DISCONTINUED | OUTPATIENT
Start: 2023-02-27 | End: 2023-03-01

## 2023-02-27 RX ORDER — DIPHENHYDRAMINE HCL 50 MG
25 CAPSULE ORAL EVERY 6 HOURS
Refills: 0 | Status: DISCONTINUED | OUTPATIENT
Start: 2023-02-27 | End: 2023-03-01

## 2023-02-27 RX ORDER — SODIUM CHLORIDE 9 MG/ML
1000 INJECTION, SOLUTION INTRAVENOUS
Refills: 0 | Status: DISCONTINUED | OUTPATIENT
Start: 2023-02-27 | End: 2023-02-27

## 2023-02-27 RX ORDER — OXYTOCIN 10 UNIT/ML
333.33 VIAL (ML) INJECTION
Qty: 20 | Refills: 0 | Status: COMPLETED | OUTPATIENT
Start: 2023-02-27 | End: 2023-02-27

## 2023-02-27 RX ORDER — OXYCODONE HYDROCHLORIDE 5 MG/1
5 TABLET ORAL
Refills: 0 | Status: DISCONTINUED | OUTPATIENT
Start: 2023-02-27 | End: 2023-03-01

## 2023-02-27 RX ORDER — OXYTOCIN 10 UNIT/ML
41.67 VIAL (ML) INJECTION
Qty: 20 | Refills: 0 | Status: DISCONTINUED | OUTPATIENT
Start: 2023-02-27 | End: 2023-03-01

## 2023-02-27 RX ORDER — DIBUCAINE 1 %
1 OINTMENT (GRAM) RECTAL EVERY 6 HOURS
Refills: 0 | Status: DISCONTINUED | OUTPATIENT
Start: 2023-02-27 | End: 2023-03-01

## 2023-02-27 RX ORDER — SIMETHICONE 80 MG/1
80 TABLET, CHEWABLE ORAL EVERY 4 HOURS
Refills: 0 | Status: DISCONTINUED | OUTPATIENT
Start: 2023-02-27 | End: 2023-03-01

## 2023-02-27 RX ORDER — IBUPROFEN 200 MG
600 TABLET ORAL EVERY 6 HOURS
Refills: 0 | Status: DISCONTINUED | OUTPATIENT
Start: 2023-02-27 | End: 2023-03-01

## 2023-02-27 RX ORDER — OXYTOCIN 10 UNIT/ML
VIAL (ML) INJECTION
Qty: 30 | Refills: 0 | Status: DISCONTINUED | OUTPATIENT
Start: 2023-02-27 | End: 2023-02-27

## 2023-02-27 RX ORDER — CITRIC ACID/SODIUM CITRATE 300-500 MG
15 SOLUTION, ORAL ORAL EVERY 6 HOURS
Refills: 0 | Status: DISCONTINUED | OUTPATIENT
Start: 2023-02-27 | End: 2023-03-01

## 2023-02-27 RX ORDER — LANOLIN
1 OINTMENT (GRAM) TOPICAL EVERY 6 HOURS
Refills: 0 | Status: DISCONTINUED | OUTPATIENT
Start: 2023-02-27 | End: 2023-03-01

## 2023-02-27 RX ORDER — PRAMOXINE HYDROCHLORIDE 150 MG/15G
1 AEROSOL, FOAM RECTAL EVERY 4 HOURS
Refills: 0 | Status: DISCONTINUED | OUTPATIENT
Start: 2023-02-27 | End: 2023-03-01

## 2023-02-27 RX ORDER — TETANUS TOXOID, REDUCED DIPHTHERIA TOXOID AND ACELLULAR PERTUSSIS VACCINE, ADSORBED 5; 2.5; 8; 8; 2.5 [IU]/.5ML; [IU]/.5ML; UG/.5ML; UG/.5ML; UG/.5ML
0.5 SUSPENSION INTRAMUSCULAR ONCE
Refills: 0 | Status: DISCONTINUED | OUTPATIENT
Start: 2023-02-27 | End: 2023-03-01

## 2023-02-27 RX ADMIN — Medication 975 MILLIGRAM(S): at 22:39

## 2023-02-27 RX ADMIN — ONDANSETRON 4 MILLIGRAM(S): 8 TABLET, FILM COATED ORAL at 14:25

## 2023-02-27 RX ADMIN — SODIUM CHLORIDE 3 MILLILITER(S): 9 INJECTION INTRAMUSCULAR; INTRAVENOUS; SUBCUTANEOUS at 22:39

## 2023-02-27 RX ADMIN — Medication 1000 MILLIUNIT(S)/MIN: at 18:11

## 2023-02-27 RX ADMIN — FAMOTIDINE 20 MILLIGRAM(S): 10 INJECTION INTRAVENOUS at 13:10

## 2023-02-27 RX ADMIN — Medication 2 MILLIUNIT(S)/MIN: at 11:24

## 2023-02-27 RX ADMIN — Medication 975 MILLIGRAM(S): at 23:09

## 2023-02-27 RX ADMIN — SODIUM CHLORIDE 125 MILLILITER(S): 9 INJECTION, SOLUTION INTRAVENOUS at 11:24

## 2023-02-27 NOTE — PATIENT PROFILE OB - NSWEIGHTCALCTOOLDRUG_GEN_A_CORE
----- Message from Reginaldo Diaz sent at 6/22/2022  4:06 PM CDT -----  .Type:  Patient Returning Call    Who Called:Pt  Who Left Message for Patient:Ms. Hernandez  Does the patient know what this is regarding?:  Would the patient rather a call back or a response via [facility]? Call  Best Call Back Number:.[phone]  Additional Information:         used

## 2023-02-27 NOTE — DISCHARGE NOTE OB - CARE PROVIDER_API CALL
Nasim Clay  OBSTETRICS AND GYNECOLOGY  [address], [address] [place] [address]  Phone: [phone]  Fax: [phone]  Established Patient  Follow Up Time: 1 month

## 2023-02-27 NOTE — DISCHARGE NOTE OB - PATIENT PORTAL LINK FT
You can access the FollowMyHealth Patient Portal offered by [facility] by registering at the following website: [email or web address]. By joining [employer]’s FollowMyHealth portal, you will also be able to view your health information using other applications (apps) compatible with our system.

## 2023-02-27 NOTE — DISCHARGE NOTE OB - MEDICATION SUMMARY - MEDICATIONS TO TAKE
I will START or STAY ON the medications listed below when I get home from the hospital:    ibuprofen 600 mg oral tablet  -- 1 tab(s) by mouth every 6 hours, As needed, Moderate Pain -for moderate pain MDD:4  -- Indication: For for moderate pain, as needed    acetaminophen 325 mg oral tablet  -- 3 tab(s) by mouth every 6 hours, As Needed for moderate pain  -- Indication: For for moderate pain, as needed    Prenatal Multivitamins with Folic Acid 1 mg oral tablet  -- 1 tab(s) by mouth once a day  -- Indication: For continue daily (and add calcium twice daily while breastfeeding)    Senokot 8.6 mg oral tablet  -- 1 tab(s) by mouth once a day as needed for constipation  -- Indication: For for constipation, as needed

## 2023-02-27 NOTE — DISCHARGE NOTE OB - HOSPITAL COURSE
33 yo  at 40 1/7 weeks gestation, GBS negative presented c/o ruptured membranes at 0200am with mild uterine contractions.  She was found to be 4cm dilatated at the cervix.                        11.9   9.84  )-----------( 239      ( 2023 04:30 )             35.3   She progressed with pitocin augmentation and epidural anaesthesia to full dilatation.   31 yo  at 40 1/7 weeks gestation, GBS negative presented c/o ruptured membranes at 0200am with mild uterine contractions.  She was found to be 4cm dilatated at the cervix.                        11.9   9.84  )-----------( 239      ( 2023 04:30 )             35.3   She progressed with pitocin augmentation and epidural anaesthesia to full dilatation.  She delivered a viable male infant with no complications.  Her postpartum course was unremarkable.                        9.1    x     )-----------( x        ( 2023 08:18 )             26.7

## 2023-02-27 NOTE — PATIENT PROFILE OB - FALL HARM RISK - UNIVERSAL INTERVENTIONS
Bed in lowest position, wheels locked, appropriate side rails in place/Call bell, personal items and telephone in reach/Instruct patient to call for assistance before getting out of bed or chair/Non-slip footwear when patient is out of bed/[place] to call system/Physically safe environment - no spills, clutter or unnecessary equipment/Purposeful Proactive Rounding/Room/bathroom lighting operational, light cord in reach

## 2023-02-28 LAB
HCT VFR BLD CALC: 26.7 % — LOW (ref 34.5–45)
HGB BLD-MCNC: 9.1 G/DL — LOW (ref 11.5–15.5)

## 2023-02-28 RX ADMIN — Medication 975 MILLIGRAM(S): at 10:15

## 2023-02-28 RX ADMIN — SODIUM CHLORIDE 3 MILLILITER(S): 9 INJECTION INTRAMUSCULAR; INTRAVENOUS; SUBCUTANEOUS at 06:55

## 2023-02-28 RX ADMIN — Medication 600 MILLIGRAM(S): at 23:49

## 2023-02-28 RX ADMIN — Medication 600 MILLIGRAM(S): at 19:05

## 2023-02-28 RX ADMIN — Medication 600 MILLIGRAM(S): at 00:20

## 2023-02-28 RX ADMIN — Medication 600 MILLIGRAM(S): at 12:27

## 2023-02-28 RX ADMIN — Medication 975 MILLIGRAM(S): at 21:10

## 2023-02-28 RX ADMIN — Medication 600 MILLIGRAM(S): at 01:04

## 2023-02-28 RX ADMIN — Medication 600 MILLIGRAM(S): at 06:47

## 2023-02-28 RX ADMIN — Medication 1 TABLET(S): at 09:34

## 2023-02-28 RX ADMIN — Medication 600 MILLIGRAM(S): at 07:26

## 2023-02-28 RX ADMIN — Medication 975 MILLIGRAM(S): at 15:39

## 2023-02-28 RX ADMIN — Medication 975 MILLIGRAM(S): at 09:34

## 2023-03-01 VITALS
OXYGEN SATURATION: 98 % | HEART RATE: 70 BPM | SYSTOLIC BLOOD PRESSURE: 108 MMHG | DIASTOLIC BLOOD PRESSURE: 72 MMHG | RESPIRATION RATE: 16 BRPM | TEMPERATURE: 98 F

## 2023-03-01 RX ORDER — IBUPROFEN 200 MG
1 TABLET ORAL
Qty: 30 | Refills: 1
Start: 2023-03-01 | End: 2023-04-29

## 2023-03-01 RX ORDER — ACETAMINOPHEN 500 MG
3 TABLET ORAL
Qty: 0 | Refills: 0 | DISCHARGE
Start: 2023-03-01

## 2023-03-01 RX ORDER — SENNA PLUS 8.6 MG/1
1 TABLET ORAL
Qty: 10 | Refills: 1
Start: 2023-03-01 | End: 2023-03-20

## 2023-03-01 RX ADMIN — Medication 975 MILLIGRAM(S): at 02:47

## 2023-03-01 RX ADMIN — Medication 600 MILLIGRAM(S): at 06:14

## 2023-03-01 RX ADMIN — Medication 975 MILLIGRAM(S): at 03:15

## 2023-03-01 RX ADMIN — Medication 975 MILLIGRAM(S): at 08:38

## 2023-03-01 RX ADMIN — Medication 1 TABLET(S): at 08:38

## 2023-03-01 RX ADMIN — Medication 600 MILLIGRAM(S): at 00:25

## 2023-03-01 RX ADMIN — Medication 600 MILLIGRAM(S): at 06:44

## 2023-03-01 RX ADMIN — Medication 975 MILLIGRAM(S): at 09:40

## 2023-03-01 NOTE — PROGRESS NOTE ADULT - SUBJECTIVE AND OBJECTIVE BOX
PPD #1      Tolerating uterine cramps and perineal repair.  No dizziness today.    T(C): 36.9 (23 @ 08:25), Max: 37 (23 @ 17:54)  HR: 78 (23 @ 08:25) (78 - 106)  BP: 92/57 (23 @ 08:25) (92/57 - 129/69)  RR: 16 (23 @ 08:25) (16 - 18)  SpO2: 97% (23 @ 08:25) (97% - 99%)  Wt(kg): --    Abd:  soft, NT  Fundus:  firm  - Lochia- Minimal  Extr:  no Nona's sign, +1 pedal edema                        9.1    x     )-----------( x        ( 2023 08:18 )             26.7     A/P:  PPD#1       routine postpartum care and education  Breast feeding support and education.  Anticipate discharge home tomorrow
PPD #2      Ready for discharge home    T(C): 36.8 (23 @ 08:38), Max: 36.8 (23 @ 08:38)  HR: 70 (23 @ 08:38) (70 - 70)  BP: 108/72 (23 @ 08:38) (108/72 - 110/66)  RR: 16 (23 @ 08:38) (16 - 18)  SpO2: 98% (23 @ 08:38) (98% - 98%)  Wt(kg): --    Abd:  soft, NT  Fundus:  firm  - Lochia- Minimal  Extr:  no Nona's sign, +1 pedal edema                        9.1    x     )-----------( x        ( 2023 08:18 )             26.7         A/P:  PPD#2       routine postpartum care and education  Breast feeding support and education.

## 2023-03-08 DIAGNOSIS — Z3A.40 40 WEEKS GESTATION OF PREGNANCY: ICD-10-CM

## 2023-03-08 DIAGNOSIS — Z20.822 CONTACT WITH AND (SUSPECTED) EXPOSURE TO COVID-19: ICD-10-CM

## 2023-04-05 NOTE — DISCHARGE NOTE OB - DO NOT DRIVE OR OPERATE HEAVY MACHINERY WHEN TAKING NARCOTICS/PRESCRIPTION PAIN MEDICATION THAT CAN CHANGE YOUR MENTAL STATE OR CAUSE DROWSINESS
Statement Selected Mustarde Flap Text: The defect edges were debeveled with a #15 scalpel blade.  Given the size, depth and location of the defect and the proximity to free margins a Mustarde flap was deemed most appropriate. Using a sterile surgical marker, an appropriate flap was drawn incorporating the defect. The area thus outlined was incised with a #15 scalpel blade. The skin margins were undermined to an appropriate distance in all directions utilizing iris scissors. Following this, the designed flap was carried into the primary defect and sutured into place.

## 2023-10-25 NOTE — OB RN PATIENT PROFILE - PRO PRENATAL LABS ORI SOURCE RUBELLA
No respiratory distress. No stridor, Lungs sounds clear with good aeration bilaterally.
hard copy, drawn during this pregnancy

## 2024-05-08 NOTE — OB PROVIDER H&P - NS_TUBALPLANNED_OBGYN_ALL_OB
In an effort to ensure that our patients [facility]Well, a Team Member has reviewed your chart and identified an opportunity to provide the best care possible. An attempt was made to discuss or schedule overdue Preventive or Disease Management screening.     The Outcome was Contact was made, appointment declined. Care Gaps include cav visit.  Name: HOLLI ALLRED    ### Patient Details  YOB: 1954  MRN: [id number]    ### Encounter Details  Arrival Date: N/A  Discharge Date: N/A  Encounter ID: [id number] [id number]    ### Related interaction  [facility]  [place] Comprehensive Annual Visit ([place] CAV Outreach) ([email or web address])   No